# Patient Record
Sex: FEMALE | Race: WHITE
[De-identification: names, ages, dates, MRNs, and addresses within clinical notes are randomized per-mention and may not be internally consistent; named-entity substitution may affect disease eponyms.]

---

## 2018-05-10 ENCOUNTER — HOSPITAL ENCOUNTER (EMERGENCY)
Dept: HOSPITAL 80 - FED | Age: 53
Discharge: HOME | End: 2018-05-10
Payer: COMMERCIAL

## 2018-05-10 VITALS — SYSTOLIC BLOOD PRESSURE: 146 MMHG | DIASTOLIC BLOOD PRESSURE: 89 MMHG

## 2018-05-10 DIAGNOSIS — R20.2: Primary | ICD-10-CM

## 2018-05-10 LAB — PLATELET # BLD: 184 10^3/UL (ref 150–400)

## 2018-05-10 NOTE — CPEKG
Heart Rate: 62

RR Interval: 968

P-R Interval: 136

QRSD Interval: 90

QT Interval: 404

QTC Interval: 411

P Axis: 46

QRS Axis: 4

T Wave Axis: 29

EKG Severity - NORMAL ECG -

EKG Impression: SINUS RHYTHM

Electronically Signed By: Michelle Carroll 10-May-2018 21:25:18

## 2018-05-10 NOTE — EDPHY
H & P


Stated Complaint: L facial numbness


Time Seen by Provider: 05/10/18 16:27


HPI/ROS: 





CHIEF COMPLAINT:  Left-sided facial numbness





HISTORY OF PRESENT ILLNESS:  53-year-old previously healthy female presents 

with left facial numbness.  Onset of left cheek numbness at 1500 today.  

Associated with dry mouth and numbness of the upper lip.  No extremity weakness 

or numbness.  No other associated symptoms.





REVIEW OF SYSTEMS:  complete 10 point ROS negative except at noted in the HPI








- Personal History


LMP (Females 10-55): Hysterectomy


Current Tetanus/Diphtheria Vaccine: Yes


Current Tetanus Diphtheria and Acellular Pertussis (TDAP): Yes





- Medical/Surgical History


Hx Asthma: No


Hx Chronic Respiratory Disease: No


Hx Diabetes: No


Hx Cardiac Disease: No


Hx Renal Disease: No


Hx Cirrhosis: No


Hx Alcoholism: No


Hx HIV/AIDS: No


Hx Splenectomy or Spleen Trauma: No


Other PMH: c-sevtion, hysterectomy





- Social History


Smoking Status: Never smoked





- Physical Exam


Exam: 





General Appearance:  Alert, pleasant


Eyes:  Pupils equal and round, no conjunctival pallor or injection


ENT, Mouth:  Mucous membranes moist


Neck:  Normal inspection


Respiratory:  Lungs are clear to auscultation


Cardiovascular:  Regular rate and rhythm


Gastrointestinal:  Abdomen is soft and nontender


Neurological:  Alert, oriented x3, Decreased sensation to light touch in the V2 

distribution of the left face, normal sensation of the forehead and chin, motor 

5/5, normal gait


Skin:  Warm and dry, no rash


Extremities:  Normal inspection


Psychiatric:  Mood and affect normal





Constitutional: 


 Initial Vital Signs











Temperature (C)  36.7 C   05/10/18 16:16


 


Heart Rate  100   05/10/18 16:16


 


Respiratory Rate  16   05/10/18 16:16


 


Blood Pressure  157/89 H  05/10/18 16:16


 


O2 Sat (%)  96   05/10/18 16:16








 











O2 Delivery Mode               Room Air














Allergies/Adverse Reactions: 


 





No Known Allergies Allergy (Unverified 05/10/18 16:27)


 








Home Medications: 














 Medication  Instructions  Recorded


 


[facility]  05/10/18














Medical Decision Making





- Diagnostics


Imaging Results: 


 Imaging Impressions





Brain MRI  05/10/18 16:27


Impression:


 


1. There is no acute intracranial abnormality identified.


2. Nonspecific subcortical and deep white matter hyperintensities at and above 

the level of the ventricles with differential considerations as indicated 

above. Consider repeat MR evaluation (with and without contrast) in 3-6 months 

to assess for interval interval change, given the lack of any preceding studies.


 


Findings were discussed with ALEX BAIRD MD at 19:00, on 5/10/2018.


 











ED Course/Re-evaluation: 





This patient presents with isolated numbness of the left cheek and upper lip.  

I doubt that this represents a stroke.  NIH stroke score is 0. TPA would not be 

indicated in this patient.  A stroke alert was not called.  MRI of the brain 

ordered.





MRI of the brain read by Dr. Alcala reveals periventricular white matter 

hyperdensities, nonspecific.  Dr. Sheppard was consulted and will follow up 

with the patient in the office.


Differential Diagnosis: 





Altered mental status including but not limited to hypoglycemia, MS, peripheral 

neuropathy, Bell's palsy, CVA, electrolyte abnormality.





- Data Points


Laboratory Results: 


 Laboratory Results





 05/10/18 17:36 





 05/10/18 17:36 





 











  05/10/18 05/10/18





  17:36 17:36


 


WBC    7.43 10^3/uL 10^3/uL





    (3.80-9.50) 


 


RBC    4.51 10^6/uL 10^6/uL





    (4.18-5.33) 


 


Hgb    14.0 g/dL g/dL





    (12.6-16.3) 


 


Hct    40.2 % %





    (38.0-47.0) 


 


MCV    89.1 fL fL





    (81.5-99.8) 


 


MCH    31.0 pg pg





    (27.9-34.1) 


 


MCHC    34.8 g/dL g/dL





    (32.4-36.7) 


 


RDW    12.0 % %





    (11.5-15.2) 


 


Plt Count    184 10^3/uL 10^3/uL





    (150-400) 


 


MPV    11.7 fL fL





    (8.7-11.7) 


 


Neut % (Auto)    81.3 % H %





    (39.3-74.2) 


 


Lymph % (Auto)    12.9 % L %





    (15.0-45.0) 


 


Mono % (Auto)    5.0 % %





    (4.5-13.0) 


 


Eos % (Auto)    0.4 % L %





    (0.6-7.6) 


 


Baso % (Auto)    0.1 % L %





    (0.3-1.7) 


 


Nucleat RBC Rel Count    0.0 % %





    (0.0-0.2) 


 


Absolute Neuts (auto)    6.04 10^3/uL 10^3/uL





    (1.70-6.50) 


 


Absolute Lymphs (auto)    0.96 10^3/uL L 10^3/uL





    (1.00-3.00) 


 


Absolute Monos (auto)    0.37 10^3/uL 10^3/uL





    (0.30-0.80) 


 


Absolute Eos (auto)    0.03 10^3/uL 10^3/uL





    (0.03-0.40) 


 


Absolute Basos (auto)    0.01 10^3/uL L 10^3/uL





    (0.02-0.10) 


 


Absolute Nucleated RBC    0.00 10^3/uL 10^3/uL





    (0-0.01) 


 


Immature Gran %    0.3 % %





    (0.0-1.1) 


 


Immature Gran #    0.02 10^3/uL 10^3/uL





    (0.00-0.10) 


 


Sodium  144 mEq/L mEq/L  





   (135-145)  


 


Potassium  4.5 mEq/L mEq/L  





   (3.5-5.2)  


 


Chloride  106 mEq/L mEq/L  





   ()  


 


Carbon Dioxide  25 mEq/l mEq/l  





   (22-31)  


 


Anion Gap  13 mEq/L mEq/L  





   (8-16)  


 


BUN  14 mg/dL mg/dL  





   (7-23)  


 


Creatinine  0.8 mg/dL mg/dL  





   (0.6-1.0)  


 


Estimated GFR  > 60   





   


 


Glucose  105 mg/dL H mg/dL  





   ()  


 


Calcium  9.6 mg/dL mg/dL  





   (8.5-10.4)  


 


Total Bilirubin  0.5 mg/dL mg/dL  





   (0.1-1.4)  


 


Conjugated Bilirubin  0.4 mg/dL mg/dL  





   (0.0-0.5)  


 


Unconjugated Bilirubin  0.1 mg/dL mg/dL  





   (0.0-1.1)  


 


AST  22 IU/L IU/L  





   (14-46)  


 


ALT  29 IU/L IU/L  





   (9-52)  


 


Alkaline Phosphatase  64 IU/L IU/L  





   ()  


 


Total Protein  7.3 g/dL g/dL  





   (6.3-8.2)  


 


Albumin  4.3 g/dL g/dL  





   (3.5-5.0)  


 


TSH  1.950 uIU/mL uIU/mL  





   (0.465-4.680)  














Departure





- Departure


Disposition: Home, Routine, Self-Care


Clinical Impression: 


 Paresthesia





Condition: Good


Instructions:  Paresthesia (ED)


Additional Instructions: 





Return for worsening symptoms or any concerns.


 


 


Referrals: 


Frankel,Zara, MD [Primary Care Provider] - As per Instructions


Kwaku Sheppard MD [Medical Doctor] - As per Instructions (Call to make an 

appointment with Dr. Sheppard.)